# Patient Record
Sex: MALE | Race: WHITE | NOT HISPANIC OR LATINO | URBAN - METROPOLITAN AREA
[De-identification: names, ages, dates, MRNs, and addresses within clinical notes are randomized per-mention and may not be internally consistent; named-entity substitution may affect disease eponyms.]

---

## 2018-02-14 ENCOUNTER — APPOINTMENT (OUTPATIENT)
Age: 58
Setting detail: DERMATOLOGY
End: 2018-02-14

## 2018-02-14 DIAGNOSIS — L40.0 PSORIASIS VULGARIS: ICD-10-CM

## 2018-02-14 PROCEDURE — OTHER COUNSELING: OTHER

## 2018-02-14 PROCEDURE — 99202 OFFICE O/P NEW SF 15 MIN: CPT

## 2018-02-14 PROCEDURE — OTHER PRESCRIPTION: OTHER

## 2018-02-14 PROCEDURE — OTHER TREATMENT REGIMEN: OTHER

## 2018-02-14 PROCEDURE — OTHER MIPS QUALITY: OTHER

## 2018-02-14 RX ORDER — CALCIPOTRIENE AND BETAMETHASONE DIPROPIONATE 50; .5 UG/G; MG/G
SUSPENSION TOPICAL
Qty: 1 | Refills: 4 | Status: ERX | COMMUNITY
Start: 2018-02-14

## 2018-02-14 ASSESSMENT — LOCATION DETAILED DESCRIPTION DERM: LOCATION DETAILED: MID-OCCIPITAL SCALP

## 2018-02-14 ASSESSMENT — LOCATION SIMPLE DESCRIPTION DERM: LOCATION SIMPLE: POSTERIOR SCALP

## 2018-02-14 ASSESSMENT — LOCATION ZONE DERM: LOCATION ZONE: SCALP

## 2018-02-19 ENCOUNTER — RX ONLY (RX ONLY)
Age: 58
End: 2018-02-19

## 2018-02-19 RX ORDER — CLOBETASOL PROPIONATE 0.05 G/ML
SPRAY TOPICAL
Qty: 1 | Refills: 0 | Status: ERX

## 2018-02-19 RX ORDER — CLOBETASOL PROPIONATE 0.05 G/ML
SPRAY TOPICAL
Qty: 1 | Refills: 2 | Status: ERX | COMMUNITY
Start: 2018-02-19

## 2019-04-17 ENCOUNTER — APPOINTMENT (OUTPATIENT)
Age: 59
Setting detail: DERMATOLOGY
End: 2019-04-19

## 2019-04-17 DIAGNOSIS — L40.0 PSORIASIS VULGARIS: ICD-10-CM

## 2019-04-17 PROCEDURE — 99213 OFFICE O/P EST LOW 20 MIN: CPT

## 2019-04-17 PROCEDURE — OTHER OTHER: OTHER

## 2019-04-17 PROCEDURE — OTHER COUNSELING: OTHER

## 2019-04-17 PROCEDURE — OTHER PRESCRIPTION: OTHER

## 2019-04-17 PROCEDURE — OTHER TREATMENT REGIMEN: OTHER

## 2019-04-17 PROCEDURE — OTHER MIPS QUALITY: OTHER

## 2019-04-17 RX ORDER — CALCIPOTRIENE 0.05 MG/ML
SOLUTION TOPICAL
Qty: 1 | Refills: 0 | Status: ERX | COMMUNITY
Start: 2019-04-17

## 2019-04-17 RX ORDER — CLOBETASOL PROPIONATE 0.46 MG/ML
SOLUTION TOPICAL
Qty: 1 | Refills: 3 | Status: ERX | COMMUNITY
Start: 2019-04-17

## 2019-04-17 ASSESSMENT — LOCATION DETAILED DESCRIPTION DERM
LOCATION DETAILED: MID-OCCIPITAL SCALP
LOCATION DETAILED: RIGHT SUPERIOR PARIETAL SCALP

## 2019-04-17 ASSESSMENT — LOCATION ZONE DERM: LOCATION ZONE: SCALP

## 2019-04-17 ASSESSMENT — LOCATION SIMPLE DESCRIPTION DERM
LOCATION SIMPLE: SCALP
LOCATION SIMPLE: POSTERIOR SCALP

## 2019-04-17 NOTE — PROCEDURE: TREATMENT REGIMEN
Start Regimen: clobetasol 0.05 % scalp solution  Sig: Apply to affected areas on scalp and ears bid prn for itch/inflammation.  Limit use to no > 2 weeks/month.\\n	calcipotriene 0.005 % scalp solution  Sig: Apply to affected areas on scalp bid prn for itch/inflammation.  Limit use to no > 2 weeks/month. Initiate Regimen: clobetasol 0.05 % scalp solution  Sig: Apply to affected areas on scalp and ears bid prn for itch/inflammation.  Limit use to no > 2 weeks/month.\\n	calcipotriene 0.005 % scalp solution  Sig: Apply to affected areas on scalp bid prn for itch/inflammation.  Limit use to no > 2 weeks/month.

## 2019-04-17 NOTE — PROCEDURE: OTHER
Other (Free Text): Possible kenalog injections if no improvement.
Detail Level: Simple
Note Text (......Xxx Chief Complaint.): This diagnosis correlates with the